# Patient Record
Sex: FEMALE | Race: WHITE | NOT HISPANIC OR LATINO | Employment: OTHER | URBAN - METROPOLITAN AREA
[De-identification: names, ages, dates, MRNs, and addresses within clinical notes are randomized per-mention and may not be internally consistent; named-entity substitution may affect disease eponyms.]

---

## 2017-10-17 ENCOUNTER — OFFICE VISIT (OUTPATIENT)
Dept: CARDIOLOGY | Facility: CLINIC | Age: 82
End: 2017-10-17

## 2017-10-17 VITALS
BODY MASS INDEX: 32.02 KG/M2 | HEART RATE: 68 BPM | SYSTOLIC BLOOD PRESSURE: 140 MMHG | WEIGHT: 174 LBS | DIASTOLIC BLOOD PRESSURE: 88 MMHG | HEIGHT: 62 IN

## 2017-10-17 DIAGNOSIS — R06.02 SHORTNESS OF BREATH: Primary | ICD-10-CM

## 2017-10-17 DIAGNOSIS — I10 ESSENTIAL HYPERTENSION: ICD-10-CM

## 2017-10-17 PROCEDURE — 99214 OFFICE O/P EST MOD 30 MIN: CPT | Performed by: INTERNAL MEDICINE

## 2017-10-17 PROCEDURE — 93000 ELECTROCARDIOGRAM COMPLETE: CPT | Performed by: INTERNAL MEDICINE

## 2017-10-17 RX ORDER — BUMETANIDE 1 MG/1
1 TABLET ORAL DAILY
COMMUNITY
Start: 2017-10-06 | End: 2020-04-09

## 2017-10-17 RX ORDER — GUAIFENESIN 600 MG/1
1200 TABLET, EXTENDED RELEASE ORAL 2 TIMES DAILY
COMMUNITY

## 2017-10-17 RX ORDER — ALBUTEROL SULFATE 90 UG/1
2 AEROSOL, METERED RESPIRATORY (INHALATION)
COMMUNITY
Start: 2017-04-03 | End: 2020-04-09

## 2017-10-17 RX ORDER — ESOMEPRAZOLE MAGNESIUM 20 MG/1
20 FOR SUSPENSION ORAL
COMMUNITY
Start: 2017-09-13 | End: 2017-10-17

## 2017-10-17 RX ORDER — TRAMADOL HYDROCHLORIDE 50 MG/1
50 TABLET ORAL
COMMUNITY
Start: 2017-07-07

## 2017-10-17 RX ORDER — METOPROLOL SUCCINATE 50 MG/1
TABLET, EXTENDED RELEASE ORAL
Refills: 0 | COMMUNITY
Start: 2017-09-07 | End: 2020-04-09

## 2017-10-17 NOTE — PROGRESS NOTES
Date of Office Visit: 10/17/2017  Encounter Provider: Anthony Early MD  Place of Service: [facility] CARDIOLOGY  Patient Name: Sharri Jenkins  :1934    Chief complaint: Follow-up for shortness of breath and hypertension.    History of Present Illness:    I again had the pleasure of seeing your patient in cardiology office on 10/17/2017.  As  you well know, she is a very pleasant, 83 year-old white female with a past medical   history significant for hypertension and gastroesophageal reflux disease who presents   for follow-up. The patient had originally seen me on 10/27/2011, with complaints of   mild dyspnea on exertion as well as intermittent lower extremity edema. She had   undergone evaluation with a two-dimensional echocardiogram, which showed a   normal ejection fraction of 65-70%, mild concentric left ventricular hypertrophy, and   impaired relaxation. A subsequent Cardiolite stress test was performed on 2011,   which showed no evidence for ischemia or infarction.    The patient presents today for follow-up.  Unfortunately, she fell approximately 6   weeks ago, and suffered a concussion and facial injuries.  She states that she tripped   when this occurred.  She is scheduled to start rehabilitation in the next several days.    She has been very fatigued, and has had memory impairment since this occurred.    She is still short of breath with exertion, and is diaphoretic.  She did have to start   taking Bumex for some lower extremity edema as well.  This has now resolved.    Her HCTZ was discontinued when the Bumex was started.  Her blood pressure is   borderline elevated at 140/88 today.    Past Medical History:   Diagnosis Date   • Atrial septal aneurysm    • GERD (gastroesophageal reflux disease)    • Hypertension    • Osteoarthritis    • SOB (shortness of breath)        Past Surgical History:   Procedure Laterality Date   • VAGINAL HYSTERECTOMY SALPINGO  OOPHORECTOMY  1991       Current Outpatient Prescriptions on File Prior to Visit   Medication Sig Dispense Refill   • aspirin 81 MG tablet Take  by mouth.     • Glucosamine HCl (GLUCOSAMINE PO) Take  by mouth.     • ibuprofen (ADVIL,MOTRIN) 200 MG tablet Take  by mouth.     • Omega-3 Fatty Acids (ULTRA OMEGA 3 PO) Take  by mouth.     • [DISCONTINUED] Cholecalciferol (VITAMIN D-3) 1000 UNITS capsule Take  by mouth.     • [DISCONTINUED] hydrochlorothiazide (HYDRODIURIL) 25 MG tablet Take 25 mg by mouth Daily.     • [DISCONTINUED] Misc Natural Products (HERBAL ENERGY COMPLEX PO) Take  by mouth.     • [DISCONTINUED] naproxen (EC NAPROSYN) 500 MG EC tablet Take  by mouth.     • [DISCONTINUED] naproxen sodium (ALEVE) 220 MG tablet Take  by mouth.     • [DISCONTINUED] pantoprazole (PROTONIX) 40 MG pack packet Take  by mouth.     • [DISCONTINUED] Probiotic Product (PROBIOTIC PO) Take  by mouth.     • [DISCONTINUED] Pseudoephedrine-Guaifenesin ER (MUCINEX D) 120-1200 MG tablet sustained-release 12 hour Take  by mouth.     • [DISCONTINUED] ranitidine (ZANTAC) 150 MG tablet Take 150 mg by mouth 2 (Two) Times a Day.     • [DISCONTINUED] SUPER B COMPLEX/C PO Take  by mouth Daily.       No current facility-administered medications on file prior to visit.      Allergies as of 10/17/2017 - Ha as Reviewed 10/17/2017   Allergen Reaction Noted   • Cortisone  10/14/2016   • Other Diarrhea 09/06/2017   • Penicillins  10/14/2016     Social History     Social History   • Marital status:      Spouse name: N/A   • Number of children: N/A   • Years of education: N/A     Occupational History   • Not on file.     Social History Main Topics   • Smoking status: Never Smoker   • Smokeless tobacco: Never Used   • Alcohol use No   • Drug use: No   • Sexual activity: Defer     Other Topics Concern   • Not on file     Social History Narrative     Family History   Problem Relation Age of Onset   • Heart disease Father    • Heart attack Father  "66       Review of Systems   Constitution: Positive for malaise/fatigue.   HENT: Positive for hearing loss.    Respiratory: Positive for cough and wheezing.    Endocrine: Positive for cold intolerance and heat intolerance.   Skin: Positive for itching and rash.   Musculoskeletal: Positive for joint pain and myalgias.   Gastrointestinal: Positive for abdominal pain, diarrhea and nausea.   Genitourinary: Positive for frequency.   Psychiatric/Behavioral: The patient is nervous/anxious.    All other systems reviewed and are negative.    Objective:     Vitals:    10/17/17 1116   BP: 140/88   BP Location: Left arm   Pulse: 68   Weight: 174 lb (78.9 kg)   Height: 62\" (157.5 cm)     Body mass index is 31.83 kg/(m^2).    Physical Exam   Constitutional: She is oriented to person, place, and time. She appears well-developed and well-nourished.   HENT:   Head: Normocephalic and atraumatic.   Eyes: Conjunctivae are normal.   Neck: Neck supple.   Cardiovascular: Normal rate and regular rhythm.  Exam reveals no gallop and no friction rub.    No murmur heard.  Pulmonary/Chest: Effort normal and breath sounds normal.   Abdominal: Soft. There is no tenderness.   Musculoskeletal: She exhibits no edema.   Neurological: She is alert and oriented to person, place, and time.   Skin: Skin is warm.   Psychiatric: She has a normal mood and affect. Her behavior is normal.     Lab Review:     ECG 12 Lead  Date/Time: 10/17/2017 11:15 AM  Performed by: LES JARQUIN  Authorized by: LES JARQUIN   Comparison: compared with previous ECG from 3/28/2014  Similar to previous ECG  Rhythm: sinus rhythm  Rate: normal  BPM: 68  Conduction: LAFB  Clinical impression: abnormal ECG          Cardiac Procedures:  1. Echocardiogram on 10/13/2011, showed an ejection fraction of 65-70%, mild   concentric left ventricular hypertrophy, and an atrial septal aneurysm.  2. Cardiolite stress test on 11/04/2011, was normal with no evidence of ischemia   or " infarction.     Assessment:       Diagnosis Plan   1. Shortness of breath  Adult Transthoracic Echo Complete W/ Cont if Necessary Per Protocol   2. Essential hypertension       Plan:       As noted, the patient has been more short of breath recently, and has had   diaphoresis as well.  However, she is still recovering from a significant fall at   which time she had a concussion several weeks ago.  She is currently on   Bumex at 1 mg per day, and this was substituted for the HCTZ.  Her lower   extremity edema has been much better.  She will continue on the aspirin.    Her blood pressure is marginal today at 140/88; however, I feel that running   her about this level would be good for her energy, especially at age 83.  I   advised her to call me if her blood pressure runs much higher than this   routinely.  I am going to check an echocardiogram at this point to reevaluate   for any potential structural heart disease.  Otherwise, I will have her   follow-up with me in 4 months unless other issues arise.

## 2017-10-26 ENCOUNTER — APPOINTMENT (OUTPATIENT)
Dept: CARDIOLOGY | Facility: HOSPITAL | Age: 82
End: 2017-10-26
Attending: INTERNAL MEDICINE

## 2017-10-26 ENCOUNTER — HOSPITAL ENCOUNTER (OUTPATIENT)
Dept: CARDIOLOGY | Facility: HOSPITAL | Age: 82
Discharge: HOME | End: 2017-10-26
Attending: INTERNAL MEDICINE | Admitting: INTERNAL MEDICINE

## 2017-10-26 VITALS
BODY MASS INDEX: 31.28 KG/M2 | HEART RATE: 88 BPM | HEIGHT: 62 IN | SYSTOLIC BLOOD PRESSURE: 142 MMHG | DIASTOLIC BLOOD PRESSURE: 60 MMHG | WEIGHT: 170 LBS

## 2017-10-26 DIAGNOSIS — R06.02 SHORTNESS OF BREATH: ICD-10-CM

## 2017-10-26 LAB
BH CV ECHO MEAS - ACS: 1.9 CM
BH CV ECHO MEAS - AO MAX PG (FULL): 1.5 MMHG
BH CV ECHO MEAS - AO MAX PG: 10.1 MMHG
BH CV ECHO MEAS - AO MEAN PG (FULL): 0.88 MMHG
BH CV ECHO MEAS - AO MEAN PG: 5.3 MMHG
BH CV ECHO MEAS - AO ROOT AREA (BSA CORRECTED): 2.1
BH CV ECHO MEAS - AO ROOT AREA: 10.7 CM^2
BH CV ECHO MEAS - AO ROOT DIAM: 3.7 CM
BH CV ECHO MEAS - AO V2 MAX: 158.8 CM/SEC
BH CV ECHO MEAS - AO V2 MEAN: 105.7 CM/SEC
BH CV ECHO MEAS - AO V2 VTI: 29.4 CM
BH CV ECHO MEAS - AVA(I,A): 2.7 CM^2
BH CV ECHO MEAS - AVA(I,D): 2.7 CM^2
BH CV ECHO MEAS - AVA(V,A): 2.5 CM^2
BH CV ECHO MEAS - AVA(V,D): 2.5 CM^2
BH CV ECHO MEAS - BSA(HAYCOCK): 1.9 M^2
BH CV ECHO MEAS - BSA: 1.8 M^2
BH CV ECHO MEAS - BZI_BMI: 31.1 KILOGRAMS/M^2
BH CV ECHO MEAS - BZI_METRIC_HEIGHT: 157.5 CM
BH CV ECHO MEAS - BZI_METRIC_WEIGHT: 77.1 KG
BH CV ECHO MEAS - CONTRAST EF (2CH): 67.3 ML/M^2
BH CV ECHO MEAS - CONTRAST EF 4CH: 64.2 ML/M^2
BH CV ECHO MEAS - EDV(MOD-SP2): 55 ML
BH CV ECHO MEAS - EDV(MOD-SP4): 67 ML
BH CV ECHO MEAS - EDV(TEICH): 116.2 ML
BH CV ECHO MEAS - EF(CUBED): 88.1 %
BH CV ECHO MEAS - EF(MOD-SP2): 67.3 %
BH CV ECHO MEAS - EF(MOD-SP4): 64.2 %
BH CV ECHO MEAS - EF(TEICH): 81.9 %
BH CV ECHO MEAS - ESV(MOD-SP2): 18 ML
BH CV ECHO MEAS - ESV(MOD-SP4): 24 ML
BH CV ECHO MEAS - ESV(TEICH): 21 ML
BH CV ECHO MEAS - FS: 50.8 %
BH CV ECHO MEAS - IVS/LVPW: 1.1
BH CV ECHO MEAS - IVSD: 1.1 CM
BH CV ECHO MEAS - LAT PEAK E' VEL: 6 CM/SEC
BH CV ECHO MEAS - LV DIASTOLIC VOL/BSA (35-75): 37.6 ML/M^2
BH CV ECHO MEAS - LV MASS(C)D: 204.3 GRAMS
BH CV ECHO MEAS - LV MASS(C)DI: 114.5 GRAMS/M^2
BH CV ECHO MEAS - LV MAX PG: 8.6 MMHG
BH CV ECHO MEAS - LV MEAN PG: 4.4 MMHG
BH CV ECHO MEAS - LV SYSTOLIC VOL/BSA (12-30): 13.5 ML/M^2
BH CV ECHO MEAS - LV V1 MAX: 146.3 CM/SEC
BH CV ECHO MEAS - LV V1 MEAN: 97.9 CM/SEC
BH CV ECHO MEAS - LV V1 VTI: 29.3 CM
BH CV ECHO MEAS - LVIDD: 5 CM
BH CV ECHO MEAS - LVIDS: 2.4 CM
BH CV ECHO MEAS - LVLD AP2: 6.5 CM
BH CV ECHO MEAS - LVLD AP4: 6.4 CM
BH CV ECHO MEAS - LVLS AP2: 5.1 CM
BH CV ECHO MEAS - LVLS AP4: 5.5 CM
BH CV ECHO MEAS - LVOT AREA (M): 2.5 CM^2
BH CV ECHO MEAS - LVOT AREA: 2.7 CM^2
BH CV ECHO MEAS - LVOT DIAM: 1.8 CM
BH CV ECHO MEAS - LVPWD: 1.1 CM
BH CV ECHO MEAS - MED PEAK E' VEL: 6 CM/SEC
BH CV ECHO MEAS - MV A DUR: 0.08 SEC
BH CV ECHO MEAS - MV A MAX VEL: 127.9 CM/SEC
BH CV ECHO MEAS - MV DEC SLOPE: 276.7 CM/SEC^2
BH CV ECHO MEAS - MV DEC TIME: 0.25 SEC
BH CV ECHO MEAS - MV E MAX VEL: 68.6 CM/SEC
BH CV ECHO MEAS - MV E/A: 0.54
BH CV ECHO MEAS - MV MAX PG: 6.1 MMHG
BH CV ECHO MEAS - MV MEAN PG: 2.9 MMHG
BH CV ECHO MEAS - MV P1/2T MAX VEL: 69.8 CM/SEC
BH CV ECHO MEAS - MV P1/2T: 73.9 MSEC
BH CV ECHO MEAS - MV V2 MAX: 123.5 CM/SEC
BH CV ECHO MEAS - MV V2 MEAN: 81 CM/SEC
BH CV ECHO MEAS - MV V2 VTI: 26.8 CM
BH CV ECHO MEAS - MVA P1/2T LCG: 3.2 CM^2
BH CV ECHO MEAS - MVA(P1/2T): 3 CM^2
BH CV ECHO MEAS - MVA(VTI): 2.9 CM^2
BH CV ECHO MEAS - PA MAX PG (FULL): 4.9 MMHG
BH CV ECHO MEAS - PA MAX PG: 8.3 MMHG
BH CV ECHO MEAS - PA V2 MAX: 143.9 CM/SEC
BH CV ECHO MEAS - PULM A REVS DUR: 109 SEC
BH CV ECHO MEAS - PULM A REVS VEL: 35.7 CM/SEC
BH CV ECHO MEAS - PULM DIAS VEL: 40.4 CM/SEC
BH CV ECHO MEAS - PULM S/D: 1.4
BH CV ECHO MEAS - PULM SYS VEL: 58.2 CM/SEC
BH CV ECHO MEAS - PVA(V,A): 2.2 CM^2
BH CV ECHO MEAS - PVA(V,D): 2.2 CM^2
BH CV ECHO MEAS - QP/QS: 0.91
BH CV ECHO MEAS - RAP SYSTOLE: 3 MMHG
BH CV ECHO MEAS - RV MAX PG: 3.4 MMHG
BH CV ECHO MEAS - RV MEAN PG: 1.9 MMHG
BH CV ECHO MEAS - RV V1 MAX: 92.2 CM/SEC
BH CV ECHO MEAS - RV V1 MEAN: 64.5 CM/SEC
BH CV ECHO MEAS - RV V1 VTI: 20.4 CM
BH CV ECHO MEAS - RVOT AREA: 3.5 CM^2
BH CV ECHO MEAS - RVOT DIAM: 2.1 CM
BH CV ECHO MEAS - RVSP: 21 MMHG
BH CV ECHO MEAS - SI(AO): 176.1 ML/M^2
BH CV ECHO MEAS - SI(CUBED): 60.4 ML/M^2
BH CV ECHO MEAS - SI(LVOT): 43.9 ML/M^2
BH CV ECHO MEAS - SI(MOD-SP2): 20.7 ML/M^2
BH CV ECHO MEAS - SI(MOD-SP4): 24.1 ML/M^2
BH CV ECHO MEAS - SI(TEICH): 53.4 ML/M^2
BH CV ECHO MEAS - SUP REN AO DIAM: 1.6 CM
BH CV ECHO MEAS - SV(AO): 314.2 ML
BH CV ECHO MEAS - SV(CUBED): 107.7 ML
BH CV ECHO MEAS - SV(LVOT): 78.4 ML
BH CV ECHO MEAS - SV(MOD-SP2): 37 ML
BH CV ECHO MEAS - SV(MOD-SP4): 43 ML
BH CV ECHO MEAS - SV(RVOT): 71.5 ML
BH CV ECHO MEAS - SV(TEICH): 95.2 ML
BH CV ECHO MEAS - TAPSE (>1.6): 1.4 CM2
BH CV ECHO MEAS - TR MAX VEL: 213.1 CM/SEC
BH CV XLRA - RV BASE: 2.6 CM
BH CV XLRA - TDI S': 15 CM/SEC
E/E' RATIO: 12
LEFT ATRIUM VOLUME INDEX: 36 ML/M2

## 2017-10-26 PROCEDURE — 93306 TTE W/DOPPLER COMPLETE: CPT | Performed by: INTERNAL MEDICINE

## 2017-10-26 PROCEDURE — 93306 TTE W/DOPPLER COMPLETE: CPT

## 2017-10-27 ENCOUNTER — TELEPHONE (OUTPATIENT)
Dept: CARDIOLOGY | Facility: CLINIC | Age: 82
End: 2017-10-27

## 2018-07-18 ENCOUNTER — TRANSCRIBE ORDERS (OUTPATIENT)
Dept: ADMINISTRATIVE | Facility: HOSPITAL | Age: 83
End: 2018-07-18

## 2018-07-18 DIAGNOSIS — N39.0 URINARY TRACT INFECTION WITHOUT HEMATURIA, SITE UNSPECIFIED: ICD-10-CM

## 2018-07-18 DIAGNOSIS — B37.9 INFECTION DUE TO CANDIDA SPECIES: Primary | ICD-10-CM

## 2018-07-19 ENCOUNTER — HOSPITAL ENCOUNTER (OUTPATIENT)
Dept: GENERAL RADIOLOGY | Facility: HOSPITAL | Age: 83
Discharge: HOME | End: 2018-07-19
Admitting: INTERNAL MEDICINE

## 2018-07-19 DIAGNOSIS — B37.9 INFECTION DUE TO CANDIDA SPECIES: ICD-10-CM

## 2018-07-19 DIAGNOSIS — N39.0 URINARY TRACT INFECTION WITHOUT HEMATURIA, SITE UNSPECIFIED: ICD-10-CM

## 2018-07-19 LAB
ALBUMIN SERPL-MCNC: 3.5 G/DL (ref 3.5–5.2)
ALBUMIN/GLOB SERPL: 1.6 G/DL
ALP SERPL-CCNC: 66 U/L (ref 39–117)
ALT SERPL W P-5'-P-CCNC: 9 U/L (ref 1–33)
ANION GAP SERPL CALCULATED.3IONS-SCNC: 12.2 MMOL/L
AST SERPL-CCNC: 16 U/L (ref 1–32)
BILIRUB SERPL-MCNC: <0.2 MG/DL (ref 0.1–1.2)
BUN BLD-MCNC: 40 MG/DL (ref 8–23)
BUN/CREAT SERPL: 25.8 (ref 7–25)
CALCIUM SPEC-SCNC: 9.3 MG/DL (ref 8.6–10.5)
CHLORIDE SERPL-SCNC: 100 MMOL/L (ref 98–107)
CO2 SERPL-SCNC: 28.8 MMOL/L (ref 22–29)
CREAT BLD-MCNC: 1.55 MG/DL (ref 0.57–1)
DEPRECATED RDW RBC AUTO: 45.1 FL (ref 37–54)
ERYTHROCYTE [DISTWIDTH] IN BLOOD BY AUTOMATED COUNT: 12.1 % (ref 11.7–13)
GFR SERPL CREATININE-BSD FRML MDRD: 32 ML/MIN/1.73
GLOBULIN UR ELPH-MCNC: 2.2 GM/DL
GLUCOSE BLD-MCNC: 180 MG/DL (ref 65–99)
HCT VFR BLD AUTO: 32.7 % (ref 35.6–45.5)
HGB BLD-MCNC: 10.5 G/DL (ref 11.9–15.5)
MCH RBC QN AUTO: 32.8 PG (ref 26.9–32)
MCHC RBC AUTO-ENTMCNC: 32.1 G/DL (ref 32.4–36.3)
MCV RBC AUTO: 102.2 FL (ref 80.5–98.2)
PLATELET # BLD AUTO: 264 10*3/MM3 (ref 140–500)
PMV BLD AUTO: 9.6 FL (ref 6–12)
POTASSIUM BLD-SCNC: 4 MMOL/L (ref 3.5–5.2)
PROT SERPL-MCNC: 5.7 G/DL (ref 6–8.5)
RBC # BLD AUTO: 3.2 10*6/MM3 (ref 3.9–5.2)
SODIUM BLD-SCNC: 141 MMOL/L (ref 136–145)
WBC NRBC COR # BLD: 6.88 10*3/MM3 (ref 4.5–10.7)

## 2018-07-19 PROCEDURE — C1751 CATH, INF, PER/CENT/MIDLINE: HCPCS

## 2018-07-19 PROCEDURE — 85027 COMPLETE CBC AUTOMATED: CPT | Performed by: INTERNAL MEDICINE

## 2018-07-19 PROCEDURE — 77001 FLUOROGUIDE FOR VEIN DEVICE: CPT

## 2018-07-19 PROCEDURE — 80053 COMPREHEN METABOLIC PANEL: CPT | Performed by: INTERNAL MEDICINE

## 2018-07-19 PROCEDURE — 76937 US GUIDE VASCULAR ACCESS: CPT

## 2018-07-19 PROCEDURE — 96365 THER/PROPH/DIAG IV INF INIT: CPT

## 2018-07-19 RX ORDER — LIDOCAINE HYDROCHLORIDE 10 MG/ML
10 INJECTION, SOLUTION INFILTRATION; PERINEURAL ONCE
Status: COMPLETED | OUTPATIENT
Start: 2018-07-19 | End: 2018-07-19

## 2018-07-19 RX ADMIN — LIDOCAINE HYDROCHLORIDE 8 ML: 10 INJECTION, SOLUTION INFILTRATION; PERINEURAL at 11:03

## 2018-07-30 ENCOUNTER — LAB REQUISITION (OUTPATIENT)
Dept: LAB | Facility: HOSPITAL | Age: 83
End: 2018-07-30

## 2018-07-30 DIAGNOSIS — N39.0 URINARY TRACT INFECTION: ICD-10-CM

## 2018-07-30 LAB
ALBUMIN SERPL-MCNC: 3.5 G/DL (ref 3.5–5.2)
ALBUMIN/GLOB SERPL: 1.6 G/DL
ALP SERPL-CCNC: 60 U/L (ref 40–129)
ALT SERPL W P-5'-P-CCNC: 12 U/L (ref 5–33)
ANION GAP SERPL CALCULATED.3IONS-SCNC: 11 MMOL/L
AST SERPL-CCNC: 23 U/L (ref 5–32)
BASOPHILS # BLD AUTO: 0.01 10*3/MM3 (ref 0–0.2)
BASOPHILS NFR BLD AUTO: 0.1 % (ref 0–2)
BILIRUB SERPL-MCNC: <0.2 MG/DL (ref 0.2–1.2)
BUN BLD-MCNC: 50 MG/DL (ref 8–23)
BUN/CREAT SERPL: 31.6 (ref 7–25)
CALCIUM SPEC-SCNC: 9.5 MG/DL (ref 8.8–10.5)
CHLORIDE SERPL-SCNC: 97 MMOL/L (ref 98–107)
CO2 SERPL-SCNC: 33 MMOL/L (ref 22–29)
CREAT BLD-MCNC: 1.58 MG/DL (ref 0.57–1)
DEPRECATED RDW RBC AUTO: 42.7 FL (ref 37–54)
EOSINOPHIL # BLD AUTO: 0 10*3/MM3 (ref 0.1–0.3)
EOSINOPHIL NFR BLD AUTO: 0 % (ref 0–4)
ERYTHROCYTE [DISTWIDTH] IN BLOOD BY AUTOMATED COUNT: 11.7 % (ref 11.5–14.5)
GFR SERPL CREATININE-BSD FRML MDRD: 31 ML/MIN/1.73
GLOBULIN UR ELPH-MCNC: 2.2 GM/DL
GLUCOSE BLD-MCNC: 88 MG/DL (ref 65–99)
HCT VFR BLD AUTO: 33.1 % (ref 37–47)
HGB BLD-MCNC: 11 G/DL (ref 12–16)
IMM GRANULOCYTES # BLD: 0.02 10*3/MM3 (ref 0–0.03)
IMM GRANULOCYTES NFR BLD: 0.2 % (ref 0–0.5)
LYMPHOCYTES # BLD AUTO: 2.23 10*3/MM3 (ref 0.6–4.8)
LYMPHOCYTES NFR BLD AUTO: 26.5 % (ref 20–45)
MCH RBC QN AUTO: 33.5 PG (ref 27–31)
MCHC RBC AUTO-ENTMCNC: 33.2 G/DL (ref 31–37)
MCV RBC AUTO: 100.9 FL (ref 81–99)
MONOCYTES # BLD AUTO: 0.78 10*3/MM3 (ref 0–1)
MONOCYTES NFR BLD AUTO: 9.3 % (ref 3–8)
NEUTROPHILS # BLD AUTO: 5.38 10*3/MM3 (ref 1.5–8.3)
NEUTROPHILS NFR BLD AUTO: 63.9 % (ref 45–70)
NRBC BLD MANUAL-RTO: 0 /100 WBC (ref 0–0)
PLATELET # BLD AUTO: 250 10*3/MM3 (ref 140–500)
PMV BLD AUTO: 10.3 FL (ref 7.4–10.4)
POTASSIUM BLD-SCNC: 4.2 MMOL/L (ref 3.5–5.2)
PROT SERPL-MCNC: 5.7 G/DL (ref 6–8.5)
RBC # BLD AUTO: 3.28 10*6/MM3 (ref 4.2–5.4)
SODIUM BLD-SCNC: 141 MMOL/L (ref 136–145)
WBC NRBC COR # BLD: 8.42 10*3/MM3 (ref 4.8–10.8)

## 2018-07-30 PROCEDURE — 80053 COMPREHEN METABOLIC PANEL: CPT | Performed by: INTERNAL MEDICINE

## 2018-07-30 PROCEDURE — 85025 COMPLETE CBC W/AUTO DIFF WBC: CPT | Performed by: INTERNAL MEDICINE

## 2019-01-17 ENCOUNTER — OFFICE VISIT (OUTPATIENT)
Dept: CARDIOLOGY | Facility: CLINIC | Age: 84
End: 2019-01-17

## 2019-01-17 VITALS
WEIGHT: 174 LBS | BODY MASS INDEX: 32.02 KG/M2 | DIASTOLIC BLOOD PRESSURE: 72 MMHG | HEIGHT: 62 IN | SYSTOLIC BLOOD PRESSURE: 132 MMHG | HEART RATE: 93 BPM | OXYGEN SATURATION: 98 %

## 2019-01-17 DIAGNOSIS — R06.02 SHORTNESS OF BREATH: ICD-10-CM

## 2019-01-17 DIAGNOSIS — I10 ESSENTIAL HYPERTENSION: Primary | ICD-10-CM

## 2019-01-17 PROCEDURE — 99214 OFFICE O/P EST MOD 30 MIN: CPT | Performed by: INTERNAL MEDICINE

## 2019-01-17 RX ORDER — TRIAMCINOLONE ACETONIDE 55 UG/1
SPRAY, METERED NASAL
COMMUNITY

## 2019-01-17 RX ORDER — POMALIDOMIDE 4 MG/1
CAPSULE ORAL
COMMUNITY
Start: 2019-01-03

## 2019-01-17 RX ORDER — KETOCONAZOLE 20 MG/ML
SHAMPOO TOPICAL 2 TIMES WEEKLY
COMMUNITY
Start: 2018-08-06

## 2019-01-17 RX ORDER — SULFAMETHOXAZOLE AND TRIMETHOPRIM 800; 160 MG/1; MG/1
1 TABLET ORAL 3 TIMES WEEKLY
COMMUNITY
Start: 2018-12-28 | End: 2019-01-25

## 2019-01-17 RX ORDER — RIVAROXABAN 20 MG/1
TABLET, FILM COATED ORAL
Refills: 11 | COMMUNITY
Start: 2019-01-03

## 2019-01-17 RX ORDER — DEXAMETHASONE 4 MG/1
20 TABLET ORAL
COMMUNITY
Start: 2019-01-11

## 2019-01-17 RX ORDER — ACYCLOVIR 200 MG/1
CAPSULE ORAL
Refills: 5 | COMMUNITY
Start: 2019-01-03

## 2019-01-17 RX ORDER — LIDOCAINE AND PRILOCAINE 25; 25 MG/G; MG/G
CREAM TOPICAL
COMMUNITY
Start: 2018-08-16

## 2019-01-17 RX ORDER — ONDANSETRON HYDROCHLORIDE 8 MG/1
8 TABLET, FILM COATED ORAL
COMMUNITY
Start: 2017-11-21

## 2019-01-17 RX ORDER — AMLODIPINE BESYLATE 2.5 MG/1
2.5 TABLET ORAL DAILY
COMMUNITY
Start: 2018-05-23 | End: 2019-01-29 | Stop reason: DRUGHIGH

## 2019-01-17 RX ORDER — TRAMADOL HYDROCHLORIDE 50 MG/1
TABLET ORAL
COMMUNITY
Start: 2018-11-19 | End: 2019-01-17 | Stop reason: SDUPTHER

## 2019-01-17 RX ORDER — MONTELUKAST SODIUM 10 MG/1
TABLET ORAL
COMMUNITY
Start: 2018-11-14

## 2019-01-28 NOTE — PROGRESS NOTES
Date of Office Visit: 2019  Encounter Provider: Anthony Early MD  Place of Service: [facility] CARDIOLOGY  Patient Name: Sharri Jenkins  :1934    Chief complaint: Follow-up for hypertension and shortness of breath.    History of Present Illness:    I again had the pleasure of seeing your patient in cardiology office on 2019  As  you well know, she is a very pleasant, 84 year-old white female with a past medical   history significant for hypertension and gastroesophageal reflux disease who presents   for follow-up. The patient had originally seen me on 10/27/2011, with complaints of   mild dyspnea on exertion as well as intermittent lower extremity edema. She had   undergone evaluation with a two-dimensional echocardiogram, which showed a   normal ejection fraction of 65-70%, mild concentric left ventricular hypertrophy, and   impaired relaxation. A subsequent Cardiolite stress test was performed on 2011,   which showed no evidence for ischemia or infarction.    The patient presents today for follow-up.  Unfortunately, since her last visit with me,   she has been diagnosed with multiple myeloma.  She has been on oral chemotherapy   and is now on immunotherapy, and is followed at [facility] for this.  Fatigue has been   her main issue, and it can be very significant at times.  Her shortness of breath has   been at baseline, and mainly occurs with moderate activity or more.  She has not had   any severe shortness of breath or change.  She has not had any chest pain. She does   have intermittent diaphoresis randomly.  Her medications have been adjusted because   of the chemotherapy, and she is now off of Toprol and HCTZ.  She is currently taking   Bumex 2 mg per day on average, and amlodipine 10 mg per day.      Past Medical History:   Diagnosis Date   • Atrial septal aneurysm    • GERD (gastroesophageal reflux disease)    • Hypertension    • Multiple myeloma  (CMS/formerly [facility])    • Osteoarthritis    • SOB (shortness of breath)    • UTI (urinary tract infection)        Past Surgical History:   Procedure Laterality Date   • VAGINAL HYSTERECTOMY SALPINGO OOPHORECTOMY  1991       Current Outpatient Medications on File Prior to Visit   Medication Sig Dispense Refill   • acyclovir (ZOVIRAX) 200 MG capsule TK 1 C PO TID  5   • albuterol (PROVENTIL HFA;VENTOLIN HFA) 108 (90 Base) MCG/ACT inhaler Inhale 2 puffs.     • amLODIPine (NORVASC) 2.5 MG tablet Take 2.5 mg by mouth Daily.     • aspirin 81 MG tablet Take  by mouth.     • bumetanide (BUMEX) 1 MG tablet Take 1 mg by mouth Daily.     • butenafine (LOTRIMIN ULTRA) 1 % cream Apply 30 application topically.     • Calcium Carb-Cholecalciferol (CALCIUM-VITAMIN D) 600-400 MG-UNIT tablet TK 2 TS PO D  5   • dexamethasone (DECADRON) 4 MG tablet Take 20 mg by mouth.     • Glucosamine HCl (GLUCOSAMINE PO) Take  by mouth.     • guaiFENesin (MUCINEX) 600 MG 12 hr tablet Take 1,200 mg by mouth 2 (Two) Times a Day.     • ibuprofen (ADVIL,MOTRIN) 200 MG tablet Take  by mouth.     • ketoconazole (NIZORAL) 2 % shampoo by Intratympanic route 2 (Two) Times a Week.     • Lactase (LACTAID FAST ACT PO) Take  by mouth Daily.     • Lactase 9000 units chewable tablet Chew.     • lidocaine-prilocaine (EMLA) 2.5-2.5 % cream by Intratympanic route.     • Lifitegrast (XIIDRA) 5 % solution INSTILL 1 DROP IN OU BID     • metoprolol succinate XL (TOPROL-XL) 50 MG 24 hr tablet take 1 tablet by mouth daily  0   • montelukast (SINGULAIR) 10 MG tablet TAKE 1 TABLET BY MOUTH EVERY NIGHT AT BEDTIME     • Omega-3 Fatty Acids (ULTRA OMEGA 3 PO) Take  by mouth.     • ondansetron (ZOFRAN) 8 MG tablet Take 8 mg by mouth.     • POMALYST 4 MG chemo capsule      • traMADol (ULTRAM) 50 MG tablet Take 50 mg by mouth.     • Triamcinolone Acetonide (NASACORT) 55 MCG/ACT nasal inhaler into the nostril(s) as directed by provider.     • XARELTO 20 MG tablet TK 1 T PO D WITH DINNER  11  "    No current facility-administered medications on file prior to visit.      Allergies as of 01/17/2019 - Reviewed 01/17/2019   Allergen Reaction Noted   • Cortisone  10/14/2016   • Other Diarrhea 09/06/2017   • Penicillins  10/14/2016     Social History     Socioeconomic History   • Marital status:      Spouse name: Not on file   • Number of children: Not on file   • Years of education: Not on file   • Highest education level: Not on file   Social Needs   • Financial resource strain: Not on file   • Food insecurity - worry: Not on file   • Food insecurity - inability: Not on file   • Transportation needs - medical: Not on file   • Transportation needs - non-medical: Not on file   Occupational History   • Not on file   Tobacco Use   • Smoking status: Never Smoker   • Smokeless tobacco: Never Used   Substance and Sexual Activity   • Alcohol use: No   • Drug use: No   • Sexual activity: Defer   Other Topics Concern   • Not on file   Social History Narrative   • Not on file     Family History   Problem Relation Age of Onset   • Heart disease Father    • Heart attack Father 66       Review of Systems   Constitution: Positive for malaise/fatigue.   Eyes: Positive for blurred vision.   Musculoskeletal: Positive for joint pain.   Neurological: Positive for excessive daytime sleepiness and numbness.   All other systems reviewed and are negative.     Objective:     Vitals:    01/17/19 1415   BP: 132/72   BP Location: Left arm   Pulse: 93   SpO2: 98%   Weight: 78.9 kg (174 lb)   Height: 157.5 cm (62\")     Body mass index is 31.83 kg/m².    Physical Exam   Constitutional: She is oriented to person, place, and time. She appears well-developed and well-nourished.   HENT:   Head: Normocephalic and atraumatic.   Eyes: Conjunctivae are normal.   Neck: Neck supple.   Cardiovascular: Normal rate and regular rhythm. Exam reveals no gallop and no friction rub.   No murmur heard.  Pulmonary/Chest: Effort normal and breath sounds " normal.   Abdominal: Soft. There is no tenderness.   Musculoskeletal: She exhibits no edema.   Neurological: She is alert and oriented to person, place, and time.   Skin: Skin is warm.   Psychiatric: She has a normal mood and affect. Her behavior is normal.     Lab Review:   Procedures    Cardiac Procedures:  1. Echocardiogram on 10/13/2011, showed an ejection fraction of 65-70%, mild concentric   left ventricular hypertrophy, and an atrial septal aneurysm.  2. Cardiolite stress test on 11/04/2011, was normal with no evidence of ischemia or   Infarction.   3. Echocardiogram on 10/26/2017: Ejection fraction was 64%.  There was mild LVH.    There was grade 1 diastolic dysfunction.  The left atrium was mildly enlarged.  There   was no significant valvular disease.    Assessment:       Diagnosis Plan   1. Essential hypertension     2. Shortness of breath       Plan:       The patient seems to be stable at this point. Again, she was diagnosed with multiple myeloma   and has been on oral chemotherapy and immunotherapy.  Her main issue has been fatigue,   which can be significant at times.  Her swelling and edema has been minimal, and she is   taking Bumex at 2 mg per day on average.  She did develop some renal dysfunction with the   chemotherapy, and her HCTZ was discontinued.  For unclear reasons, her metoprolol was   discontinued, and she has been placed on amlodipine.  She is currently at 10 mg per day.  I   told her that the amlodipine may be contributing to her fatigue, especially if it is decreasing   her blood pressure approximately after the 10 mg dose.  I advised her to cut this in half and   go to 5 mg per day.  If she develops severe hypertension or any issues with this dose, she   will let me know.  Her last echocardiogram actually looked excellent in 2017.  For now, I will   have her follow-up with me in 6 months.

## 2019-01-29 RX ORDER — AMLODIPINE BESYLATE 5 MG/1
TABLET ORAL
Qty: 135 TABLET | Refills: 3 | Status: SHIPPED | OUTPATIENT
Start: 2019-01-29 | End: 2020-04-09

## 2019-07-24 ENCOUNTER — OFFICE VISIT (OUTPATIENT)
Dept: CARDIOLOGY | Facility: CLINIC | Age: 84
End: 2019-07-24

## 2019-07-24 VITALS
BODY MASS INDEX: 34.23 KG/M2 | SYSTOLIC BLOOD PRESSURE: 122 MMHG | DIASTOLIC BLOOD PRESSURE: 64 MMHG | HEIGHT: 62 IN | WEIGHT: 186 LBS | HEART RATE: 84 BPM | OXYGEN SATURATION: 96 %

## 2019-07-24 DIAGNOSIS — I10 ESSENTIAL HYPERTENSION: Primary | ICD-10-CM

## 2019-07-24 DIAGNOSIS — R06.02 SHORTNESS OF BREATH: ICD-10-CM

## 2019-07-24 PROCEDURE — 99213 OFFICE O/P EST LOW 20 MIN: CPT | Performed by: INTERNAL MEDICINE

## 2019-08-11 NOTE — PROGRESS NOTES
Date of Office Visit: 2019  Encounter Provider: Anthony Early MD  Place of Service: [facility] CARDIOLOGY  Patient Name: Sharri Jenkins  :1934    Chief complaint: Follow-up for hypertension and shortness of breath.    History of Present Illness:    I again had the pleasure of seeing your patient in cardiology office on 2019  As  you well know, she is a very pleasant, 85 year-old white female with a past medical   history significant for hypertension and gastroesophageal reflux disease who presents   for follow-up. The patient had originally seen me on 10/27/2011, with complaints of   mild dyspnea on exertion as well as intermittent lower extremity edema. She had   undergone evaluation with a two-dimensional echocardiogram, which showed a   normal ejection fraction of 65-70%, mild concentric left ventricular hypertrophy, and   impaired relaxation. A subsequent Cardiolite stress test was performed on 2011,   which showed no evidence for ischemia or infarction.     The patient presents today for follow-up.    She is still having significant nausea,   fatigue, and diaphoresis from her chemotherapy for the multiple myeloma.  Her   shortness of breath is the same, but has not increased.  Her blood pressure has   been controlled on the amlodipine and Toprol.  She is only taking Bumex as needed   currently.      Past Medical History:   Diagnosis Date   • Atrial septal aneurysm    • GERD (gastroesophageal reflux disease)    • Hypertension    • Multiple myeloma (CMS/HCC)    • Osteoarthritis    • SOB (shortness of breath)    • UTI (urinary tract infection)        Past Surgical History:   Procedure Laterality Date   • VAGINAL HYSTERECTOMY SALPINGO OOPHORECTOMY         Current Outpatient Medications on File Prior to Visit   Medication Sig Dispense Refill   • acyclovir (ZOVIRAX) 200 MG capsule TK 1 C PO TID  5   • albuterol (PROVENTIL HFA;VENTOLIN HFA) 108 (90  Base) MCG/ACT inhaler Inhale 2 puffs.     • amLODIPine (NORVASC) 5 MG tablet TAKE 1 TABLET DAILY AND 1 EXTRA TABLET PRN IF BLOOD PRESSURE REMAIN ELEVATED 135 tablet 3   • aspirin 81 MG tablet Take  by mouth.     • bumetanide (BUMEX) 1 MG tablet Take 1 mg by mouth Daily.     • butenafine (LOTRIMIN ULTRA) 1 % cream Apply 30 application topically.     • Calcium Carb-Cholecalciferol (CALCIUM-VITAMIN D) 600-400 MG-UNIT tablet TK 2 TS PO D  5   • dexamethasone (DECADRON) 4 MG tablet Take 20 mg by mouth.     • Glucosamine HCl (GLUCOSAMINE PO) Take  by mouth.     • guaiFENesin (MUCINEX) 600 MG 12 hr tablet Take 1,200 mg by mouth 2 (Two) Times a Day.     • ibuprofen (ADVIL,MOTRIN) 200 MG tablet Take  by mouth.     • ketoconazole (NIZORAL) 2 % shampoo by Intratympanic route 2 (Two) Times a Week.     • Lactase (LACTAID FAST ACT PO) Take  by mouth Daily.     • Lactase 9000 units chewable tablet Chew.     • lidocaine-prilocaine (EMLA) 2.5-2.5 % cream by Intratympanic route.     • Lifitegrast (XIIDRA) 5 % solution INSTILL 1 DROP IN OU BID     • metoprolol succinate XL (TOPROL-XL) 50 MG 24 hr tablet take 1 tablet by mouth daily  0   • montelukast (SINGULAIR) 10 MG tablet TAKE 1 TABLET BY MOUTH EVERY NIGHT AT BEDTIME     • Omega-3 Fatty Acids (ULTRA OMEGA 3 PO) Take  by mouth.     • ondansetron (ZOFRAN) 8 MG tablet Take 8 mg by mouth.     • POMALYST 4 MG chemo capsule      • traMADol (ULTRAM) 50 MG tablet Take 50 mg by mouth.     • Triamcinolone Acetonide (NASACORT) 55 MCG/ACT nasal inhaler into the nostril(s) as directed by provider.     • XARELTO 20 MG tablet TK 1 T PO D WITH DINNER  11     No current facility-administered medications on file prior to visit.      Allergies as of 07/24/2019 - Reviewed 01/27/2019   Allergen Reaction Noted   • Cortisone  10/14/2016   • Other Diarrhea 09/06/2017   • Penicillins  10/14/2016     Social History     Socioeconomic History   • Marital status:      Spouse name: Not on file   • Number  "of children: Not on file   • Years of education: Not on file   • Highest education level: Not on file   Tobacco Use   • Smoking status: Never Smoker   • Smokeless tobacco: Never Used   Substance and Sexual Activity   • Alcohol use: No   • Drug use: No   • Sexual activity: Defer     Family History   Problem Relation Age of Onset   • Heart disease Father    • Heart attack Father 66       Review of Systems   Constitution: Positive for diaphoresis, weakness and malaise/fatigue.   Musculoskeletal: Positive for joint pain.   Gastrointestinal: Positive for nausea and vomiting.   Neurological: Positive for light-headedness.   All other systems reviewed and are negative.     Objective:     Vitals:    07/24/19 1446   BP: 122/64   Pulse: 84   SpO2: 96%   Weight: 84.4 kg (186 lb)   Height: 157.5 cm (62.01\")     Body mass index is 34.01 kg/m².    Physical Exam   Constitutional: She is oriented to person, place, and time. She appears well-developed and well-nourished.   HENT:   Head: Normocephalic and atraumatic.   Eyes: Conjunctivae are normal.   Neck: Neck supple.   Cardiovascular: Normal rate and regular rhythm. Exam reveals no gallop and no friction rub.   No murmur heard.  Pulmonary/Chest: Effort normal and breath sounds normal.   Abdominal: Soft. There is no tenderness.   Musculoskeletal: She exhibits no edema.   Neurological: She is alert and oriented to person, place, and time.   Skin: Skin is warm.   Psychiatric: She has a normal mood and affect. Her behavior is normal.     Lab Review:   Procedures    Cardiac Procedures:  1. Echocardiogram on 10/13/2011, showed an ejection fraction of 65-70%, mild concentric   left ventricular hypertrophy, and an atrial septal aneurysm.  2. Cardiolite stress test on 11/04/2011, was normal with no evidence of ischemia or   Infarction.   3. Echocardiogram on 10/26/2017: Ejection fraction was 64%.  There was mild LVH.    There was grade 1 diastolic dysfunction.  The left atrium was mildly " enlarged.  There   was no significant valvular disease.    Assessment:       Diagnosis Plan   1. Essential hypertension     2. Shortness of breath       Plan:       Again, her biggest issues are from side effects from the chemotherapy for her multiple myeloma.  From a cardiac perspective, she is stable.  She has had an extensive work-up for her shortness of breath in the past, and this has not changed.  She is currently taking amlodipine 5 mg/day and as needed if her blood pressure is high.  She is currently also taking metoprolol.  I did not change any of her medications today.  I will plan on seeing her in 8 months unless other issues arise.

## 2020-03-31 ENCOUNTER — TELEPHONE (OUTPATIENT)
Dept: CARDIOLOGY | Facility: CLINIC | Age: 85
End: 2020-03-31

## 2020-03-31 NOTE — TELEPHONE ENCOUNTER
03/31/20  5:38 PM    Patient called for COVID rescreening.    Patient would  Like to set up a telephone visit..    Patient is not having new cardiovascular symptoms.     JONATHAN Vo  [facility]

## 2020-04-09 ENCOUNTER — OFFICE VISIT (OUTPATIENT)
Dept: CARDIOLOGY | Facility: CLINIC | Age: 85
End: 2020-04-09

## 2020-04-09 VITALS
SYSTOLIC BLOOD PRESSURE: 106 MMHG | DIASTOLIC BLOOD PRESSURE: 64 MMHG | HEART RATE: 82 BPM | WEIGHT: 178 LBS | BODY MASS INDEX: 32.76 KG/M2 | HEIGHT: 62 IN

## 2020-04-09 DIAGNOSIS — C90.00 MULTIPLE MYELOMA, REMISSION STATUS UNSPECIFIED (HCC): ICD-10-CM

## 2020-04-09 DIAGNOSIS — I10 ESSENTIAL HYPERTENSION: Primary | ICD-10-CM

## 2020-04-09 DIAGNOSIS — N18.30 CKD (CHRONIC KIDNEY DISEASE) STAGE 3, GFR 30-59 ML/MIN (HCC): ICD-10-CM

## 2020-04-09 PROBLEM — K21.9 GERD (GASTROESOPHAGEAL REFLUX DISEASE): Status: ACTIVE | Noted: 2020-04-09

## 2020-04-09 PROCEDURE — 99443 PR PHYS/QHP TELEPHONE EVALUATION 21-30 MIN: CPT | Performed by: NURSE PRACTITIONER

## 2020-04-09 RX ORDER — PYRIDOXINE HCL (VITAMIN B6) 100 MG
1 TABLET ORAL DAILY
COMMUNITY

## 2020-04-09 RX ORDER — SULFAMETHOXAZOLE AND TRIMETHOPRIM 800; 160 MG/1; MG/1
TABLET ORAL
COMMUNITY
Start: 2020-03-17

## 2020-04-09 RX ORDER — TORSEMIDE 20 MG/1
TABLET ORAL
COMMUNITY
Start: 2020-03-24

## 2020-04-09 RX ORDER — DILTIAZEM HYDROCHLORIDE 120 MG/1
120 CAPSULE, EXTENDED RELEASE ORAL DAILY
COMMUNITY
Start: 2020-02-13 | End: 2020-04-09 | Stop reason: SDUPTHER

## 2020-04-09 RX ORDER — BUPROPION HYDROCHLORIDE 150 MG/1
150 TABLET ORAL DAILY
COMMUNITY
Start: 2020-03-26

## 2020-04-09 RX ORDER — DILTIAZEM HYDROCHLORIDE 120 MG/1
120 CAPSULE, EXTENDED RELEASE ORAL DAILY
Qty: 30 CAPSULE | Refills: 0 | Status: SHIPPED | OUTPATIENT
Start: 2020-04-09

## 2020-04-09 NOTE — PROGRESS NOTES
[facility] CARDIOLOGY  [address]  [facility] [address]  Phone: [phone]      Patient Name: Sharri Jenkins  :1934  Age: 85 y.o.  Primary Cardiologist: Derrek Early MD  Encounter Provider:  JONATHAN Matthew      Chief Complaint:   Chief Complaint   Patient presents with   • telephone         HPI  Sharri Jenkins is a 85 y.o. female who presents today via audio visit secondary to COVID pandemic. Patient presents today for 3-month reevaluation.     Pt has a  history significant for hypertension and GERD, multiple myeloma, LE DVT..    Patient reports that about 15 weeks ago she began having problems with her chemotherapy agent.  She has been seen by a nephrologist and some of her medications have been changed.  Her blood pressure has been in the 100s-120s/70s before taking antihypertensives..  She is currently taking diltiazem, torsemide.  She has recently been started on Welbutrin for fatigue by her NP with the oncology group because they thought she may be depressed as a result of her fatigue.  Patient reports that she does not have any depression at all and actually would prefer not to be on an antidepressant.  Patient states that lower extremity edema has been controlled.  She denies any chest pain, shortness of breath, lightheadedness, palpitations.    The following portions of the patient's history were reviewed and updated as appropriate: allergies, current medications, past family history, past medical history, past social history, past surgical history and problem list.      Review of Systems   Constitution: Positive for malaise/fatigue.   Cardiovascular: Negative for chest pain and leg swelling.   Respiratory: Negative for shortness of breath.    Neurological: Negative for light-headedness.   All other systems reviewed and are negative.      OBJECTIVE:     Vitals:    20 1349   BP: 106/64   Pulse: 82   Weight: 80.7 kg (178 lb)  "  Height: 157.5 cm (62\")     Body mass index is 32.56 kg/m².    Physical Exam  Physical exam not performed secondary to audio visit.    Procedures    Cardiac Procedures:  1. Echocardiogram on 10/13/2011, showed an ejection fraction of 65-70%, mild concentric left ventricular hypertrophy, and an atrial septal aneurysm.  2. Cardiolite stress test on 11/04/2011, was normal with no evidence of ischemia or Infarction.   3. Echocardiogram on 10/26/2017: Ejection fraction was 64%.  There was mild LVH.  There was grade 1 diastolic dysfunction.  The left atrium was mildly enlarged.  There was no significant valvular disease.  4. Echocardiogram 4/8/2020 at [facility].  Mild LVH.  LVEF 70%.  Grade 1 diastolic dysfunction.  Mild hypertrophic obstructive cardiomyopathy.  LAE.  Small intra-atrial septal aneurysm.  No PFO.    ASSESSMENT:      Diagnosis Plan   1. Essential hypertension     2. CKD (chronic kidney disease) stage 3, GFR 30-59 ml/min (CMS/[facility])     3. Multiple myeloma, remission status unspecified (CMS/[facility])           PLAN OF CARE:     Patient has had multiple medication changes secondary to renal disease and her multiple myeloma medications.  Overall from cardiovascular standpoint she is doing well.  Patient notes that her blood pressure has been averaging in the 100s-120s before taking medications.  At this point I have recommended that we hold diltiazem and monitor blood pressure.  She was advised that if blood pressure is greater than 140 to take diltiazem.  Patient agrees that she thinks that this could help her fatigue and that she has probably overmedicated.  Patient will follow-up with me early next week to reevaluate blood pressure.    This patient has consented to a telehealth visit via audio. I spent 23 minutes in total including but not limited to the direct conversation with this patient. All vitals recorded within this visit are reported by the patient.         Discharge Medications           " Accurate as of April 9, 2020  2:05 PM. If you have any questions, ask your nurse or doctor.               Continue These Medications      Instructions Start Date   acyclovir 200 MG capsule  Commonly known as:  ZOVIRAX   TK 1 C PO TID      amLODIPine 5 MG tablet  Commonly known as:  NORVASC   TAKE 1 TABLET DAILY AND 1 EXTRA TABLET PRN IF BLOOD PRESSURE REMAIN ELEVATED      bumetanide 1 MG tablet  Commonly known as:  BUMEX   1 mg, Oral, Daily      buPROPion  MG 24 hr tablet  Commonly known as:  WELLBUTRIN XL   150 mg, Oral, Daily      Calcium-Vitamin D 600-400 MG-UNIT tablet   TK 2 TS PO D      dexamethasone 4 MG tablet  Commonly known as:  DECADRON   20 mg, Oral      dilTIAZem 120 MG 24 hr capsule  Commonly known as:  TIAZAC   120 mg, Oral, Daily      GLUCOSAMINE PO   Oral      guaiFENesin 600 MG 12 hr tablet  Commonly known as:  MUCINEX   1,200 mg, Oral, 2 Times Daily      ibuprofen 200 MG tablet  Commonly known as:  ADVIL,MOTRIN   Oral      ketoconazole 2 % shampoo  Commonly known as:  NIZORAL   Intratympanic, 2 Times Weekly      LACTAID FAST ACT PO   Oral, Daily      Lactase 9000 units chewable tablet   Oral      lidocaine-prilocaine 2.5-2.5 % cream  Commonly known as:  EMLA   Intratympanic      metoprolol succinate XL 50 MG 24 hr tablet  Commonly known as:  TOPROL-XL   take 1 tablet by mouth daily      montelukast 10 MG tablet  Commonly known as:  SINGULAIR   TAKE 1 TABLET BY MOUTH EVERY NIGHT AT BEDTIME      ondansetron 8 MG tablet  Commonly known as:  ZOFRAN   8 mg, Oral      Pomalyst 4 MG chemo capsule  Generic drug:  pomalidomide   No dose, route, or frequency recorded.      pyridoxine 100 MG tablet  Commonly known as:  VITAMIN B-6   1 tablet, Oral, Daily      sulfamethoxazole-trimethoprim 800-160 MG per tablet  Commonly known as:  BACTRIM DS,SEPTRA DS   No dose, route, or frequency recorded.      torsemide 20 MG tablet  Commonly known as:  DEMADEX   TK 2 TS PO D      traMADol 50 MG tablet  Commonly  known as:  ULTRAM   50 mg, Oral      Triamcinolone Acetonide 55 MCG/ACT nasal inhaler  Commonly known as:  NASACORT   Nasal      ULTRA OMEGA 3 PO   Oral      Xarelto 20 MG tablet  Generic drug:  rivaroxaban   TK 1 T PO D WITH DINNER      Xiidra 5 % ophthalmic solution  Generic drug:  Lifitegrast   INSTILL 1 DROP IN OU BID         Stop These Medications    albuterol sulfate  (90 Base) MCG/ACT inhaler  Commonly known as:  PROVENTIL HFA;VENTOLIN HFA;PROAIR HFA  Stopped by:  JONATHAN Matthew     aspirin 81 MG tablet  Stopped by:  JONATHAN Matthew     butenafine 1 % cream  Commonly known as:  LOTRIMIN ULTRA  Stopped by:  JONATHAN Matthew            Thank you for allowing me to participate in the care of your patient,         JONATHAN Matthew  [facility]  04/09/20  2:05 PM      **Nicanor Disclaimer:**  Much of this encounter note is an electronic transcription/translation of spoken language to printed text. The electronic translation of spoken language may permit erroneous, or at times, nonsensical words or phrases to be inadvertently transcribed. Although I have reviewed the note for such errors, some may still exist.

## 2020-04-10 ENCOUNTER — TELEPHONE (OUTPATIENT)
Dept: CARDIOLOGY | Facility: CLINIC | Age: 85
End: 2020-04-10

## 2020-04-10 NOTE — TELEPHONE ENCOUNTER
----- Message from JONATHAN Matthew sent at 4/9/2020  3:18 PM EDT -----  Please call and schedule patient a telephone visit with me in early next week.

## 2020-04-14 ENCOUNTER — OFFICE VISIT (OUTPATIENT)
Dept: CARDIOLOGY | Facility: CLINIC | Age: 85
End: 2020-04-14

## 2020-04-14 VITALS — SYSTOLIC BLOOD PRESSURE: 132 MMHG | HEART RATE: 99 BPM | DIASTOLIC BLOOD PRESSURE: 72 MMHG

## 2020-04-14 DIAGNOSIS — I10 ESSENTIAL HYPERTENSION: Primary | ICD-10-CM

## 2020-04-14 DIAGNOSIS — C90.00 MULTIPLE MYELOMA, REMISSION STATUS UNSPECIFIED (HCC): ICD-10-CM

## 2020-04-14 DIAGNOSIS — N18.30 CKD (CHRONIC KIDNEY DISEASE) STAGE 3, GFR 30-59 ML/MIN (HCC): ICD-10-CM

## 2020-04-14 PROCEDURE — 99422 OL DIG E/M SVC 11-20 MIN: CPT | Performed by: NURSE PRACTITIONER

## 2020-04-14 NOTE — PROGRESS NOTES
[facility] CARDIOLOGY  [address]  [facility] [address]  Phone: [phone]      Patient Name: Sharri Jenkins  :1934  Age: 85 y.o.  Primary Cardiologist: Derrek Early MD  Encounter Provider:  JONATHAN Matthew      Chief Complaint:   Chief Complaint   Patient presents with   • telephone         HPI    Sharri Jenkins is a 85 y.o. female who presents today via audio visit secondary to COVID pandemic. Patient presents today for re-evaluation of blood pressure.     Pt has a  history significant for hypertension and GERD, multiple myeloma, LE DVT..    Last week patient was complaining of generalized fatigue.  At that time her BP was averaging 110-130.  She was advised to hold her diltiazem unless she had sustained HR > 120s.  She states that she has felt slightly better with holding the diltiazem, although she has been making changes to her cancer regimen and she admits that she may be distracted as a result of that.  She will be in the hospital for 2 weeks to receive infusions.  She states that at this time, she is asymptomatic and denies chest pain, shortness of breath, palpitations, tachycardia. She does still have some generalized fatigue.     The following portions of the patient's history were reviewed and updated as appropriate: allergies, current medications, past family history, past medical history, past social history, past surgical history and problem list.      Review of Systems   Constitution: Positive for malaise/fatigue.   Cardiovascular: Negative for chest pain and leg swelling.   Respiratory: Negative for shortness of breath.    Neurological: Negative for light-headedness.   All other systems reviewed and are negative.      OBJECTIVE:     Vitals:    20 0914   BP: 132/72   Pulse: 99     There is no height or weight on file to calculate BMI.    Physical Exam    Physical exam not performed secondary to audio  visit.    Procedures      Cardiac Procedures:  1. Echocardiogram on 10/13/2011, showed an ejection fraction of 65-70%, mild concentric left ventricular hypertrophy, and an atrial septal aneurysm.  2. Cardiolite stress test on 11/04/2011, was normal with no evidence of ischemia or Infarction.   3. Echocardiogram on 10/26/2017: Ejection fraction was 64%.  There was mild LVH.  There was grade 1 diastolic dysfunction.  The left atrium was mildly enlarged.  There was no significant valvular disease.  4. Echocardiogram 4/8/2020 at [facility].  Mild LVH.  LVEF 70%.  Grade 1 diastolic dysfunction.  Mild hypertrophic obstructive cardiomyopathy.  LAE.  Small intra-atrial septal aneurysm.  No PFO.    ASSESSMENT:      Diagnosis Plan   1. Essential hypertension     2. CKD (chronic kidney disease) stage 3, GFR 30-59 ml/min (CMS/[facility])     3. Multiple myeloma, remission status unspecified (CMS/[facility])           PLAN OF CARE:     Patient reports that she still has some generalized fatigue but feels that she does have a slight bit more energy than she did last week.  She has held her diltiazem and blood pressure has remained in the 130s maximum.  She reports that after she spoke with her oncologist last week it was determined that her chemotherapy agents were not working as well as they would like.  They have recommended that she be hospitalized for a new infusion treatment.  She will going to the hospital for approximately 2 weeks to receive these infusions under supervision.  I informed the patient that during this time it may be possible that her other medications are adjusted depending on how her body reacts to the infusion.  We will keep all medications the same at this point keeping the diltiazem as needed for blood pressure greater than 140 and that we will make a follow-up appointment with her in 6 weeks after her rounds of chemotherapy are completed.  Patient verbalized understanding.  She will call the office with any  concerns or complications.    This patient has consented to a audio visit via audio. I spent 15 minutes in total including but not limited to the direct conversation with this patient. All vitals recorded within this visit are reported by the patient.         Discharge Medications           Accurate as of April 14, 2020  9:44 AM. If you have any questions, ask your nurse or doctor.               Continue These Medications      Instructions Start Date   acyclovir 200 MG capsule  Commonly known as:  ZOVIRAX   TK 1 C PO TID      buPROPion  MG 24 hr tablet  Commonly known as:  WELLBUTRIN XL   150 mg, Oral, Daily      Calcium-Vitamin D 600-400 MG-UNIT tablet   TK 2 TS PO D      dexamethasone 4 MG tablet  Commonly known as:  DECADRON   20 mg, Oral      dilTIAZem 120 MG 24 hr capsule  Commonly known as:  TIAZAC   120 mg, Oral, Daily      GLUCOSAMINE PO   Oral      guaiFENesin 600 MG 12 hr tablet  Commonly known as:  MUCINEX   1,200 mg, Oral, 2 Times Daily      ibuprofen 200 MG tablet  Commonly known as:  ADVIL,MOTRIN   Oral      ketoconazole 2 % shampoo  Commonly known as:  NIZORAL   Intratympanic, 2 Times Weekly      LACTAID FAST ACT PO   Oral, Daily      Lactase 9000 units chewable tablet   Oral      lidocaine-prilocaine 2.5-2.5 % cream  Commonly known as:  EMLA   Intratympanic      montelukast 10 MG tablet  Commonly known as:  SINGULAIR   TAKE 1 TABLET BY MOUTH EVERY NIGHT AT BEDTIME      ondansetron 8 MG tablet  Commonly known as:  ZOFRAN   8 mg, Oral      Pomalyst 4 MG chemo capsule  Generic drug:  pomalidomide   No dose, route, or frequency recorded.      pyridoxine 100 MG tablet  Commonly known as:  VITAMIN B-6   1 tablet, Oral, Daily      sulfamethoxazole-trimethoprim 800-160 MG per tablet  Commonly known as:  BACTRIM DS,SEPTRA DS   No dose, route, or frequency recorded.      torsemide 20 MG tablet  Commonly known as:  DEMADEX   TK 2 TS PO D      traMADol 50 MG tablet  Commonly known as:  ULTRAM   50 mg,  Oral      Triamcinolone Acetonide 55 MCG/ACT nasal inhaler  Commonly known as:  NASACORT   Nasal      ULTRA OMEGA 3 PO   Oral      Xarelto 20 MG tablet  Generic drug:  rivaroxaban   TK 1 T PO D WITH DINNER      Xiidra 5 % ophthalmic solution  Generic drug:  Lifitegrast   INSTILL 1 DROP IN OU BID             Thank you for allowing me to participate in the care of your patient,         JONATHAN Matthew  [facility]  04/14/20  2:05 PM      **Nicanor Disclaimer:**  Much of this encounter note is an electronic transcription/translation of spoken language to printed text. The electronic translation of spoken language may permit erroneous, or at times, nonsensical words or phrases to be inadvertently transcribed. Although I have reviewed the note for such errors, some may still exist.

## 2020-06-02 ENCOUNTER — TELEMEDICINE (OUTPATIENT)
Dept: CARDIOLOGY | Facility: CLINIC | Age: 85
End: 2020-06-02

## 2020-06-02 VITALS — BODY MASS INDEX: 32.57 KG/M2 | HEIGHT: 62 IN | WEIGHT: 177 LBS

## 2020-06-02 DIAGNOSIS — C90.00 MULTIPLE MYELOMA, REMISSION STATUS UNSPECIFIED (HCC): ICD-10-CM

## 2020-06-02 DIAGNOSIS — N18.30 CKD (CHRONIC KIDNEY DISEASE) STAGE 3, GFR 30-59 ML/MIN (HCC): ICD-10-CM

## 2020-06-02 DIAGNOSIS — I10 ESSENTIAL HYPERTENSION: Primary | ICD-10-CM

## 2020-06-02 PROCEDURE — 99214 OFFICE O/P EST MOD 30 MIN: CPT | Performed by: NURSE PRACTITIONER

## 2020-06-02 NOTE — PROGRESS NOTES
"    [facility] CARDIOLOGY  [address]  [facility] [address]  Phone: [phone]      Patient Name: Sharri Jenkins  :1934  Age: 85 y.o.  Primary Cardiologist: Derrek Ealry MD  Encounter Provider:  JONATHAN Matthew      Chief Complaint:   Chief Complaint   Patient presents with   • Follow-up     video         HPI    Sharri Jenkins is a 85 y.o. female who presents today via audio visit secondary to COVID pandemic. Patient presents today for re-evaluation of blood pressure.     Pt has a  history significant for hypertension and GERD, multiple myeloma, LE DVT..    At the last evaluation patient was having trouble with elevated heart rate as well as blood pressure.  Since that time she has started a new chemotherapy agent and is doing very well.  Reports that her blood pressure is averaging in the 140s and heart rate is averaging between .  She states that her heart rate has been very well controlled on her current regimen.  She does report some generalized weakness and fatigue that she attributes to her chemotherapy but is otherwise asymptomatic and denies any chest pain, shortness of breath, lightheadedness, lower extremity edema, palpitations.    The following portions of the patient's history were reviewed and updated as appropriate: allergies, current medications, past family history, past medical history, past social history, past surgical history and problem list.      Review of Systems   Constitution: Positive for malaise/fatigue.   Cardiovascular: Negative for chest pain and leg swelling.   Respiratory: Negative for shortness of breath.    Neurological: Negative for light-headedness.   All other systems reviewed and are negative.      OBJECTIVE:     Vitals:    20 1258   Weight: 80.3 kg (177 lb)   Height: 157.5 cm (62\")     Body mass index is 32.37 kg/m².    Physical Exam   Constitutional: She is oriented to person, place, and time. " She appears well-developed.   HENT:   Head: Normocephalic and atraumatic.   Eyes: Conjunctivae are normal.   Pulmonary/Chest: No respiratory distress.   Neurological: She is alert and oriented to person, place, and time. GCS eye subscore is 4. GCS verbal subscore is 5. GCS motor subscore is 6.   Psychiatric: She has a normal mood and affect. Her speech is normal and behavior is normal. Judgment and thought content normal. Cognition and memory are normal.         Procedures      Cardiac Procedures:  1. Echocardiogram on 10/13/2011, showed an ejection fraction of 65-70%, mild concentric left ventricular hypertrophy, and an atrial septal aneurysm.  2. Cardiolite stress test on 11/04/2011, was normal with no evidence of ischemia or Infarction.   3. Echocardiogram on 10/26/2017: Ejection fraction was 64%.  There was mild LVH.  There was grade 1 diastolic dysfunction.  The left atrium was mildly enlarged.  There was no significant valvular disease.  4. Echocardiogram 4/8/2020 at [facility].  Mild LVH.  LVEF 70%.  Grade 1 diastolic dysfunction.  Mild hypertrophic obstructive cardiomyopathy.  LAE.  Small intra-atrial septal aneurysm.  No PFO.    ASSESSMENT:      Diagnosis Plan   1. Essential hypertension     2. CKD (chronic kidney disease) stage 3, GFR 30-59 ml/min (CMS/[facility])     3. Multiple myeloma, remission status unspecified (CMS/[facility])           PLAN OF CARE:     She reports that her blood pressure and heart rate have been more controlled.  Heart rate averaging between 60-1 100s and blood pressure in the 140 systolic.  She will continue with her current regimen of Tiazac 120 mg/day.  Patient is currently on a chemotherapy regimen with the [place] [facility].  Other than generalized fatigue patient is doing very well.  She will follow-up with Dr. Early in 6 months.  Sooner for problems or complications.    This patient has consented to a audio visit via video. I spent 15 minutes in total including but not  limited to the direct conversation with this patient. All vitals recorded within this visit are reported by the patient.         Discharge Medications           Accurate as of June 2, 2020  1:29 PM. If you have any questions, ask your nurse or doctor.               Continue These Medications      Instructions Start Date   acyclovir 200 MG capsule  Commonly known as:  ZOVIRAX   TK 1 C PO TID      buPROPion  MG 24 hr tablet  Commonly known as:  WELLBUTRIN XL   150 mg, Oral, Daily      Calcium-Vitamin D 600-400 MG-UNIT tablet   TK 2 TS PO D      dexamethasone 4 MG tablet  Commonly known as:  DECADRON   20 mg, Oral      dilTIAZem 120 MG 24 hr capsule  Commonly known as:  TIAZAC   120 mg, Oral, Daily      GLUCOSAMINE PO   Oral      guaiFENesin 600 MG 12 hr tablet  Commonly known as:  MUCINEX   1,200 mg, Oral, 2 Times Daily      ibuprofen 200 MG tablet  Commonly known as:  ADVIL,MOTRIN   Oral      ketoconazole 2 % shampoo  Commonly known as:  NIZORAL   Intratympanic, 2 Times Weekly      LACTAID FAST ACT PO   Oral, Daily      Lactase 9000 units chewable tablet   Oral      lidocaine-prilocaine 2.5-2.5 % cream  Commonly known as:  EMLA   Intratympanic      montelukast 10 MG tablet  Commonly known as:  SINGULAIR   TAKE 1 TABLET BY MOUTH EVERY NIGHT AT BEDTIME      ondansetron 8 MG tablet  Commonly known as:  ZOFRAN   8 mg, Oral      Pomalyst 4 MG chemo capsule  Generic drug:  pomalidomide   No dose, route, or frequency recorded.      pyridoxine 100 MG tablet  Commonly known as:  VITAMIN B-6   1 tablet, Oral, Daily      sulfamethoxazole-trimethoprim 800-160 MG per tablet  Commonly known as:  BACTRIM DS,SEPTRA DS   No dose, route, or frequency recorded.      torsemide 20 MG tablet  Commonly known as:  DEMADEX   TK 2 TS PO D      traMADol 50 MG tablet  Commonly known as:  ULTRAM   50 mg, Oral      Triamcinolone Acetonide 55 MCG/ACT nasal inhaler  Commonly known as:  NASACORT   Nasal      ULTRA OMEGA 3 PO   Oral       Xarelto 20 MG tablet  Generic drug:  rivaroxaban   TK 1 T PO D WITH DINNER      Xiidra 5 % ophthalmic solution  Generic drug:  Lifitegrast   INSTILL 1 DROP IN OU BID             Thank you for allowing me to participate in the care of your patient,         JONATHAN Matthew  [facility]  06/02/20  2:05 PM      **Nicanor Disclaimer:**  Much of this encounter note is an electronic transcription/translation of spoken language to printed text. The electronic translation of spoken language may permit erroneous, or at times, nonsensical words or phrases to be inadvertently transcribed. Although I have reviewed the note for such errors, some may still exist.